# Patient Record
Sex: FEMALE | HISPANIC OR LATINO | URBAN - METROPOLITAN AREA
[De-identification: names, ages, dates, MRNs, and addresses within clinical notes are randomized per-mention and may not be internally consistent; named-entity substitution may affect disease eponyms.]

---

## 2019-01-15 ENCOUNTER — OFFICE VISIT (OUTPATIENT)
Dept: URBAN - METROPOLITAN AREA CLINIC 48 | Facility: CLINIC | Age: 64
End: 2019-01-15
Payer: COMMERCIAL

## 2019-01-15 DIAGNOSIS — H25.13 AGE-RELATED NUCLEAR CATARACT, BILATERAL: ICD-10-CM

## 2019-01-15 PROCEDURE — 92014 COMPRE OPH EXAM EST PT 1/>: CPT | Performed by: OPHTHALMOLOGY

## 2019-01-15 ASSESSMENT — KERATOMETRY
OD: 44.88
OS: 44.75

## 2019-01-15 ASSESSMENT — INTRAOCULAR PRESSURE
OD: 15
OS: 16

## 2019-01-15 NOTE — IMPRESSION/PLAN
Impression: Type 2 diab w mild nonprlf diabetic rtnop w/o macular edema, bilateral: D81.4339.  Plan:

## 2020-09-01 ENCOUNTER — OFFICE VISIT (OUTPATIENT)
Dept: URBAN - METROPOLITAN AREA CLINIC 48 | Facility: CLINIC | Age: 65
End: 2020-09-01
Payer: COMMERCIAL

## 2020-09-01 DIAGNOSIS — E11.9 TYPE 2 DIABETES MELLITUS WITHOUT COMPLICATIONS: ICD-10-CM

## 2020-09-01 DIAGNOSIS — H35.3131 NEXDTVE AGE-RELATED MCLR DEGN, BILATERAL, EARLY DRY STAGE: Primary | ICD-10-CM

## 2020-09-01 PROCEDURE — 92134 CPTRZ OPH DX IMG PST SGM RTA: CPT | Performed by: OPHTHALMOLOGY

## 2020-09-01 PROCEDURE — 92014 COMPRE OPH EXAM EST PT 1/>: CPT | Performed by: OPHTHALMOLOGY

## 2020-09-01 ASSESSMENT — VISUAL ACUITY
OS: 20/20
OD: 20/20

## 2020-09-01 ASSESSMENT — KERATOMETRY
OS: 44.75
OD: 44.88

## 2020-09-01 ASSESSMENT — INTRAOCULAR PRESSURE
OD: 16
OS: 13

## 2020-09-01 NOTE — IMPRESSION/PLAN
Impression: Type 2 diabetes mellitus without complications: Q89.8.  Plan: No diabetic retinopathy on today's exam

Recommend yearly diabetic exam with Dr. Laith Monk or Dr. Nadine Vallecillo

## 2020-09-01 NOTE — IMPRESSION/PLAN
Impression: Age-related nuclear cataract, bilateral: H25.13. Plan: Not visually significant to patient Will monitor Cataract eval next exam if patient desires RTC 1 year Cat  eval ( long exam)

## 2021-01-18 ENCOUNTER — TESTING ONLY (OUTPATIENT)
Dept: URBAN - METROPOLITAN AREA CLINIC 48 | Facility: CLINIC | Age: 66
End: 2021-01-18
Payer: COMMERCIAL

## 2021-01-18 PROCEDURE — 92136 OPHTHALMIC BIOMETRY: CPT | Performed by: OPHTHALMOLOGY

## 2021-01-18 ASSESSMENT — PACHYMETRY
OS: 23.13
OS: 3.24
OD: 3.13
OD: 23.21

## 2021-01-18 ASSESSMENT — KERATOMETRY
OD: 45.10
OS: 44.95

## 2021-02-02 ENCOUNTER — SURGERY (OUTPATIENT)
Dept: URBAN - METROPOLITAN AREA SURGERY 26 | Facility: SURGERY | Age: 66
End: 2021-02-02
Payer: COMMERCIAL

## 2021-02-02 PROCEDURE — 66984 XCAPSL CTRC RMVL W/O ECP: CPT | Performed by: OPHTHALMOLOGY

## 2021-02-03 ENCOUNTER — POST-OPERATIVE VISIT (OUTPATIENT)
Dept: URBAN - METROPOLITAN AREA CLINIC 48 | Facility: CLINIC | Age: 66
End: 2021-02-03
Payer: COMMERCIAL

## 2021-02-03 PROCEDURE — 99024 POSTOP FOLLOW-UP VISIT: CPT | Performed by: OPHTHALMOLOGY

## 2021-02-03 ASSESSMENT — INTRAOCULAR PRESSURE: OD: 14

## 2021-02-08 ENCOUNTER — POST-OPERATIVE VISIT (OUTPATIENT)
Dept: URBAN - METROPOLITAN AREA CLINIC 48 | Facility: CLINIC | Age: 66
End: 2021-02-08
Payer: COMMERCIAL

## 2021-02-08 DIAGNOSIS — Z48.810 ENCOUNTER FOR SURGICAL AFTERCARE FOLLOWING SURGERY ON A SENSE ORGAN: Primary | ICD-10-CM

## 2021-02-08 PROCEDURE — 99024 POSTOP FOLLOW-UP VISIT: CPT | Performed by: STUDENT IN AN ORGANIZED HEALTH CARE EDUCATION/TRAINING PROGRAM

## 2021-02-08 ASSESSMENT — INTRAOCULAR PRESSURE
OD: 15
OS: 13

## 2021-02-08 NOTE — IMPRESSION/PLAN
Impression: S/P Cataract Extraction by phacoemulsification with IOL placement OD - 6 Days. Encounter for surgical aftercare following surgery on a sense organ  Z48.810. Plan: - Right eye is doing well
- d/c ofloxacin - Begin taper of prednisolone acetate and ketorolac to TID for a week, then BID for week, then daily for a week, then stop
- RT/RD precautions reviewed with the patient
- Can resume normal activities with the exception of avoiding swimming for 1 more week Continue with 2nd surgery for CE IOL OS as scheduled

## 2021-02-16 ENCOUNTER — SURGERY (OUTPATIENT)
Dept: URBAN - METROPOLITAN AREA SURGERY 26 | Facility: SURGERY | Age: 66
End: 2021-02-16
Payer: COMMERCIAL

## 2021-02-16 DIAGNOSIS — H25.12 AGE-RELATED NUCLEAR CATARACT, LEFT EYE: Primary | ICD-10-CM

## 2021-02-16 PROCEDURE — 66984 XCAPSL CTRC RMVL W/O ECP: CPT | Performed by: OPHTHALMOLOGY

## 2021-02-17 ENCOUNTER — POST-OPERATIVE VISIT (OUTPATIENT)
Dept: URBAN - METROPOLITAN AREA CLINIC 48 | Facility: CLINIC | Age: 66
End: 2021-02-17
Payer: COMMERCIAL

## 2021-02-17 PROCEDURE — 99024 POSTOP FOLLOW-UP VISIT: CPT | Performed by: OPTOMETRIST

## 2021-02-17 ASSESSMENT — INTRAOCULAR PRESSURE
OD: 22
OS: 14

## 2021-02-17 NOTE — IMPRESSION/PLAN
Impression: S/P Cataract Extraction by phacoemulsification with IOL placement OS - 1 Day. Presence of intraocular lens  Z96.1. Plan: OD cont taper of Ket, Pred OS Start NSAID, AB, Steroid QID Restrictions discusses, RD precautions RTO 1 week PO2

## 2021-02-22 ENCOUNTER — POST-OPERATIVE VISIT (OUTPATIENT)
Dept: URBAN - METROPOLITAN AREA CLINIC 48 | Facility: CLINIC | Age: 66
End: 2021-02-22
Payer: COMMERCIAL

## 2021-02-22 ASSESSMENT — INTRAOCULAR PRESSURE
OD: 17
OS: 16

## 2021-02-22 NOTE — IMPRESSION/PLAN
Impression: S/P Cataract Extraction by phacoemulsification with IOL placement OS - 6 Days. Presence of intraocular lens  Z96.1. Excellent post op course Plan: Stop Antibiotic, NSAID Cont steroid TID x 1 week, BID x 1 week, then QD x 1 week, then D/C Disc restrictions off, RD precautions RTO 3 weeks for PO3

## 2021-03-15 ENCOUNTER — POST-OPERATIVE VISIT (OUTPATIENT)
Dept: URBAN - METROPOLITAN AREA CLINIC 48 | Facility: CLINIC | Age: 66
End: 2021-03-15
Payer: COMMERCIAL

## 2021-03-15 PROCEDURE — 99024 POSTOP FOLLOW-UP VISIT: CPT | Performed by: OPTOMETRIST

## 2021-03-15 ASSESSMENT — INTRAOCULAR PRESSURE
OD: 17
OS: 15

## 2021-03-15 NOTE — IMPRESSION/PLAN
Impression: S/P Cataract Extraction by phacoemulsification with IOL placement OS - 27 Days. Presence of intraocular lens  Z96.1. Excellent post op course Plan: PC IOL doing well and in place. She has completed all post operative drops. Okay to obtain updated refraction, if needed. 

RTC: 3 months for DFE/OCT mac with Dr. Nikolai Freedman for DM/early Dry AMD Check

## 2021-03-19 ENCOUNTER — POST-OPERATIVE VISIT (OUTPATIENT)
Dept: URBAN - METROPOLITAN AREA CLINIC 48 | Facility: CLINIC | Age: 66
End: 2021-03-19
Payer: COMMERCIAL

## 2021-03-19 DIAGNOSIS — Z96.1 PRESENCE OF INTRAOCULAR LENS: Primary | ICD-10-CM

## 2021-03-19 PROCEDURE — 99024 POSTOP FOLLOW-UP VISIT: CPT | Performed by: OPTOMETRIST

## 2021-03-19 ASSESSMENT — INTRAOCULAR PRESSURE
OD: 14
OS: 10

## 2021-03-19 NOTE — IMPRESSION/PLAN
Impression: S/P Cataract Extraction by phacoemulsification with IOL placement OS - 31 Days. Presence of intraocular lens  Z96.1. 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  Recommend patient restart Prednisolone QID x4days, TID x4days, BID x4 days, QD x4days, then D/C

RTC in 10 days for PO

## 2021-03-29 ENCOUNTER — POST-OPERATIVE VISIT (OUTPATIENT)
Dept: URBAN - METROPOLITAN AREA CLINIC 48 | Facility: CLINIC | Age: 66
End: 2021-03-29
Payer: COMMERCIAL

## 2021-03-29 PROCEDURE — 99024 POSTOP FOLLOW-UP VISIT: CPT | Performed by: OPTOMETRIST

## 2021-03-29 ASSESSMENT — INTRAOCULAR PRESSURE
OD: 14
OS: 13

## 2021-03-29 NOTE — IMPRESSION/PLAN
Impression: S/P Cataract Extraction by phacoemulsification with IOL placement OS - 41 Days. Presence of intraocular lens  Z96.1. Post operative instructions reviewed - Plan: Rare Cell and Flare still present on exam. Discussed with patient. Informed her to increase Prednisolone to TID and will follow up with Dr Radha Trammell. RTC: 1 week for DE/OCT mac for ARMD. Please eval if inflammation is still present and continue slow taper in left eye. Patient also wants to discuss lids.

## 2021-04-13 ENCOUNTER — POST-OPERATIVE VISIT (OUTPATIENT)
Dept: URBAN - METROPOLITAN AREA CLINIC 48 | Facility: CLINIC | Age: 66
End: 2021-04-13
Payer: COMMERCIAL

## 2021-04-13 PROCEDURE — 99024 POSTOP FOLLOW-UP VISIT: CPT | Performed by: STUDENT IN AN ORGANIZED HEALTH CARE EDUCATION/TRAINING PROGRAM

## 2021-04-13 ASSESSMENT — INTRAOCULAR PRESSURE
OD: 15
OS: 17

## 2021-04-13 NOTE — IMPRESSION/PLAN
Impression: S/P Cataract Extraction by phacoemulsification with IOL placement OS - 56 Days. Presence of intraocular lens  Z96.1. Plan: OS Increase Pf 6 x  daily x 1 wk. Keep appt. as scheduled with Dr. Floyd Samuels.

## 2021-04-14 ENCOUNTER — POST-OPERATIVE VISIT (OUTPATIENT)
Dept: URBAN - METROPOLITAN AREA CLINIC 48 | Facility: CLINIC | Age: 66
End: 2021-04-14
Payer: COMMERCIAL

## 2021-04-14 PROCEDURE — 99024 POSTOP FOLLOW-UP VISIT: CPT | Performed by: OPHTHALMOLOGY

## 2021-04-14 RX ORDER — ATROPINE SULFATE 10 MG/ML
1 % SOLUTION/ DROPS OPHTHALMIC
Qty: 5 | Refills: 0 | Status: INACTIVE
Start: 2021-04-14 | End: 2021-06-15

## 2021-04-14 ASSESSMENT — INTRAOCULAR PRESSURE
OD: 14
OS: 15

## 2021-04-14 NOTE — IMPRESSION/PLAN
Impression: S/P Cataract Extraction by phacoemulsification with IOL placement OS - 57 Days. Presence of intraocular lens  Z96.1. Plan: Cell and Flare still present in OS Start: Atropine 1% QD OS Continue Pred Q2hr OS [facility]

RTC Tuesday w/ Dr. Corrinne Oliva.

## 2021-04-20 ENCOUNTER — POST-OPERATIVE VISIT (OUTPATIENT)
Dept: URBAN - METROPOLITAN AREA CLINIC 48 | Facility: CLINIC | Age: 66
End: 2021-04-20
Payer: COMMERCIAL

## 2021-04-20 PROCEDURE — 99024 POSTOP FOLLOW-UP VISIT: CPT | Performed by: OPHTHALMOLOGY

## 2021-04-20 ASSESSMENT — INTRAOCULAR PRESSURE: OS: 15

## 2021-04-20 NOTE — IMPRESSION/PLAN
Impression: S/P Cataract Extraction by phacoemulsification with IOL placement OS - 63 Days. Presence of intraocular lens  Z96.1.  Post operative instructions reviewed - Condition is improving -

PF q 3 hrs OS x 1 week then q 4 hrs x week OS Plan: RTC 2 weeks PO

## 2021-05-05 ENCOUNTER — POST-OPERATIVE VISIT (OUTPATIENT)
Dept: URBAN - METROPOLITAN AREA CLINIC 48 | Facility: CLINIC | Age: 66
End: 2021-05-05
Payer: COMMERCIAL

## 2021-05-05 PROCEDURE — 99024 POSTOP FOLLOW-UP VISIT: CPT | Performed by: OPHTHALMOLOGY

## 2021-05-05 RX ORDER — PREDNISOLONE ACETATE 10 MG/ML
1 % SUSPENSION/ DROPS OPHTHALMIC
Qty: 5 | Refills: 0 | Status: INACTIVE
Start: 2021-05-05 | End: 2021-06-15

## 2021-05-05 ASSESSMENT — INTRAOCULAR PRESSURE
OS: 15
OD: 16

## 2021-05-05 NOTE — IMPRESSION/PLAN
Impression: S/P Cataract Extraction by phacoemulsification with IOL placement OS - 78 Days. Presence of intraocular lens  Z96.1. Excellent post op course   Post operative instructions reviewed - Condition is improving -  Patient has trace cell, patient has stopped her PF. Patient to use Aft 1-4 times a day.  Plan: RTC 1 week PO

## 2021-05-25 ENCOUNTER — OFFICE VISIT (OUTPATIENT)
Dept: URBAN - METROPOLITAN AREA CLINIC 48 | Facility: CLINIC | Age: 66
End: 2021-05-25
Payer: COMMERCIAL

## 2021-05-25 PROCEDURE — 92012 INTRM OPH EXAM EST PATIENT: CPT | Performed by: OPHTHALMOLOGY

## 2021-05-25 ASSESSMENT — INTRAOCULAR PRESSURE
OD: 15
OS: 15

## 2021-05-25 NOTE — IMPRESSION/PLAN
Impression: Dry eye syndrome of bilateral lacrimal glands: H04.123. Plan: D/C Pf

OS Start OTC PFAFT Systane or Refresh 6 x daily RTC Friday w/Dr. Slime Kaur

## 2021-05-28 ENCOUNTER — OFFICE VISIT (OUTPATIENT)
Dept: URBAN - METROPOLITAN AREA CLINIC 48 | Facility: CLINIC | Age: 66
End: 2021-05-28
Payer: COMMERCIAL

## 2021-05-28 DIAGNOSIS — H04.123 DRY EYE SYNDROME OF BILATERAL LACRIMAL GLANDS: Primary | ICD-10-CM

## 2021-05-28 PROCEDURE — 99213 OFFICE O/P EST LOW 20 MIN: CPT | Performed by: OPTOMETRIST

## 2021-05-28 ASSESSMENT — INTRAOCULAR PRESSURE
OD: 14
OS: 12

## 2021-05-28 NOTE — IMPRESSION/PLAN
Impression: Dry eye syndrome of bilateral lacrimal glands: H04.123. The eye looks less dry today and no AC reaction noted. Pulse of steroids didn't help the pain and she says the frequent PF AT has made it worse. No pain with palpation of sinuses though will cont to consider, patient says she has a history of sinus problems. IOP normal, inflammation less today both in [facility] [place] and anterior surface though pain persists. Plan: Cont PF AT 3-4 times daily, sample given of BromSite to use BIS OS Patient also to start O3FA

RTO 2 weeks for follow up or PRN

## 2021-06-15 ENCOUNTER — OFFICE VISIT (OUTPATIENT)
Dept: URBAN - METROPOLITAN AREA CLINIC 48 | Facility: CLINIC | Age: 66
End: 2021-06-15
Payer: COMMERCIAL

## 2021-06-15 DIAGNOSIS — H43.812 VITREOUS DEGENERATION, LEFT EYE: Primary | ICD-10-CM

## 2021-06-15 PROCEDURE — 92014 COMPRE OPH EXAM EST PT 1/>: CPT | Performed by: OPHTHALMOLOGY

## 2021-06-15 ASSESSMENT — INTRAOCULAR PRESSURE
OS: 13
OD: 14

## 2021-06-15 NOTE — IMPRESSION/PLAN
Impression: Dry eye syndrome of bilateral lacrimal glands: H04.123. The eye looks less dry today and no AC reaction noted. Pulse of steroids didn't help the pain and she says the frequent PF AT has made it worse. No pain with palpation of sinuses though will cont to consider, patient says she has a history of sinus problems. IOP normal, inflammation less today both in [facility] and anterior surface though pain persists. Plan: Cont PF AT 3-4 times daily OU Continue to monitor

## 2021-06-15 NOTE — IMPRESSION/PLAN
Impression: Vitreous degeneration, left eye: H43.812. Plan: Posterior vitreous detachment accounts for the patient's complaints. There is no evidence of retinal pathology. All signs and risks of retinal detachment and tears were discussed in detail. Patient instructed to call the office immediately if any symptoms noted.   

RTC 6 week DE/OCT Bony Abarca

## 2021-08-03 ENCOUNTER — OFFICE VISIT (OUTPATIENT)
Dept: URBAN - METROPOLITAN AREA CLINIC 48 | Facility: CLINIC | Age: 66
End: 2021-08-03
Payer: COMMERCIAL

## 2021-08-03 DIAGNOSIS — H43.811 VITREOUS DEGENERATION, RIGHT EYE: Primary | ICD-10-CM

## 2021-08-03 PROCEDURE — 92014 COMPRE OPH EXAM EST PT 1/>: CPT | Performed by: OPHTHALMOLOGY

## 2021-08-03 ASSESSMENT — INTRAOCULAR PRESSURE
OD: 12
OS: 11

## 2021-08-03 NOTE — IMPRESSION/PLAN
Impression: Vitreous degeneration, right eye: H43.811. Plan: Posterior vitreous detachment accounts for the patient's complaints. There is no evidence of retinal pathology. All signs and risks of retinal detachment and tears were discussed in detail. Patient instructed to call the office immediately if any symptoms noted.   

RTC  2-3 months DE

## 2021-11-11 ENCOUNTER — OFFICE VISIT (OUTPATIENT)
Dept: URBAN - METROPOLITAN AREA CLINIC 48 | Facility: CLINIC | Age: 66
End: 2021-11-11
Payer: COMMERCIAL

## 2021-11-11 DIAGNOSIS — H26.492 OTHER SECONDARY CATARACT, LEFT EYE: Primary | ICD-10-CM

## 2021-11-11 PROCEDURE — 92014 COMPRE OPH EXAM EST PT 1/>: CPT | Performed by: OPHTHALMOLOGY

## 2021-11-11 ASSESSMENT — INTRAOCULAR PRESSURE
OS: 13
OD: 15

## 2021-11-11 NOTE — IMPRESSION/PLAN
Impression: Other secondary cataract, left eye: H26.492. Plan: Risks, benefits, alternatives, and expectations of YAG capsulotomy were discussed. The patient desires a YAG capsulotomy in the left eye.  
Schedule YAG capsulotomy in the left eye with 1 day PO1 then 1 week PO. RL 2

## 2021-11-11 NOTE — IMPRESSION/PLAN
Impression: Vitreous degeneration, right eye: H43.811. Plan: Posterior vitreous detachment accounts for the patient's complaints. There is no evidence of retinal pathology. All signs and risks of retinal detachment and tears were discussed in detail. Patient instructed to call the office immediately if any symptoms noted.   

RTC 6 months DE

## 2021-12-06 ENCOUNTER — SURGERY (OUTPATIENT)
Dept: URBAN - METROPOLITAN AREA SURGERY 26 | Facility: SURGERY | Age: 66
End: 2021-12-06
Payer: COMMERCIAL

## 2021-12-06 PROCEDURE — 66821 AFTER CATARACT LASER SURGERY: CPT | Performed by: OPHTHALMOLOGY

## 2021-12-13 ENCOUNTER — POST-OPERATIVE VISIT (OUTPATIENT)
Dept: URBAN - METROPOLITAN AREA CLINIC 48 | Facility: CLINIC | Age: 66
End: 2021-12-13
Payer: COMMERCIAL

## 2021-12-13 PROCEDURE — 92134 CPTRZ OPH DX IMG PST SGM RTA: CPT | Performed by: STUDENT IN AN ORGANIZED HEALTH CARE EDUCATION/TRAINING PROGRAM

## 2021-12-13 PROCEDURE — 99024 POSTOP FOLLOW-UP VISIT: CPT | Performed by: STUDENT IN AN ORGANIZED HEALTH CARE EDUCATION/TRAINING PROGRAM

## 2021-12-13 NOTE — IMPRESSION/PLAN
Impression:  Encounter for surgical aftercare following surgery on a sense organ  Z48.810.  Excellent post op course Plan: f/u 1 month Dr. Sejal Rosenbaum for new floaters in [facility], 

DE/OCT

## 2022-03-01 ENCOUNTER — POST-OPERATIVE VISIT (OUTPATIENT)
Dept: URBAN - METROPOLITAN AREA CLINIC 48 | Facility: CLINIC | Age: 67
End: 2022-03-01
Payer: COMMERCIAL

## 2022-03-01 PROCEDURE — 99024 POSTOP FOLLOW-UP VISIT: CPT | Performed by: OPHTHALMOLOGY

## 2022-03-01 ASSESSMENT — INTRAOCULAR PRESSURE
OS: 15
OD: 15

## 2022-09-01 ENCOUNTER — OFFICE VISIT (OUTPATIENT)
Dept: URBAN - METROPOLITAN AREA CLINIC 48 | Facility: CLINIC | Age: 67
End: 2022-09-01
Payer: COMMERCIAL

## 2022-09-01 DIAGNOSIS — H35.3111 NONEXUDATIVE MACULAR DEGENERATION, EARLY DRY STAGE, RIGHT EYE: Primary | ICD-10-CM

## 2022-09-01 DIAGNOSIS — H04.123 DRY EYE SYNDROME OF BILATERAL LACRIMAL GLANDS: ICD-10-CM

## 2022-09-01 PROCEDURE — 92014 COMPRE OPH EXAM EST PT 1/>: CPT | Performed by: OPHTHALMOLOGY

## 2022-09-01 PROCEDURE — 92134 CPTRZ OPH DX IMG PST SGM RTA: CPT | Performed by: OPHTHALMOLOGY

## 2022-09-01 ASSESSMENT — INTRAOCULAR PRESSURE
OS: 13
OD: 13

## 2022-09-01 NOTE — IMPRESSION/PLAN
Impression: Dry eye syndrome of bilateral lacrimal glands: H04.123. Plan: ALEX w/ allergy component accounts for patients complaints. Recommend patient to use AFT 1-4 times a day OU ( brand name preferred). Hand out of different Dry eye treatment recommendations given to patient.

## 2022-09-01 NOTE — IMPRESSION/PLAN
Impression: Nonexudative macular degeneration, early dry stage, right eye: H35.3111. Plan: OCT ordered and performed today. Discussed diagnosis in detail with patient. Discussed treatment options with patient. Discussed risks and benefits and patient understands. Macular degeneration, dry type with stable vision. Will continue to monitor vision and the patient has been instructed to call with any vision changes. OZZY and AREDS2 discussed with patient.  

rtc 12 month DE OCT/MAC

## 2023-01-23 NOTE — ASSESSMENT/PLAN
Impression:  Plan: Very early Dry AMD

 Macular degeneration, dry type with stable vision. Will continue to monitor vision and the patient has been instructed to call with any vision changes. OZZY and AREDS2 discussed with patient.  

RTC 1 year DE/OCT mac ( long exam) abdominal pain

## 2023-11-21 ENCOUNTER — OFFICE VISIT (OUTPATIENT)
Dept: URBAN - METROPOLITAN AREA CLINIC 48 | Facility: CLINIC | Age: 68
End: 2023-11-21
Payer: COMMERCIAL

## 2023-11-21 DIAGNOSIS — H57.12 OCULAR PAIN, LEFT EYE: ICD-10-CM

## 2023-11-21 DIAGNOSIS — H35.3111 NONEXUDATIVE MACULAR DEGENERATION, EARLY DRY STAGE, RIGHT EYE: ICD-10-CM

## 2023-11-21 DIAGNOSIS — E11.9 TYPE 2 DIABETES MELLITUS W/O COMPLICATION: Primary | ICD-10-CM

## 2023-11-21 DIAGNOSIS — H35.371 PUCKERING OF MACULA, RIGHT EYE: ICD-10-CM

## 2023-11-21 PROCEDURE — 99214 OFFICE O/P EST MOD 30 MIN: CPT | Performed by: OPHTHALMOLOGY

## 2023-11-21 ASSESSMENT — INTRAOCULAR PRESSURE
OD: 14
OS: 15

## 2024-01-01 NOTE — IMPRESSION/PLAN
Impression: S/P YAG Capsulotomy (Yttrium Aluminum Kake) OS - 85 Days. Encounter for surgical aftercare following surgery on a sense organ  Z48.810. Post operative instructions reviewed - Condition is improving -

Discussed in full detail Floater from Yag laser.  Plan: RTC 3 months DFE/OCT MAC [place]

## 2024-04-11 ENCOUNTER — OFFICE VISIT (OUTPATIENT)
Dept: URBAN - METROPOLITAN AREA CLINIC 48 | Facility: CLINIC | Age: 69
End: 2024-04-11
Payer: MEDICARE

## 2024-04-11 DIAGNOSIS — H04.123 DRY EYE SYNDROME OF BILATERAL LACRIMAL GLANDS: Primary | ICD-10-CM

## 2024-04-11 PROCEDURE — 99212 OFFICE O/P EST SF 10 MIN: CPT | Performed by: STUDENT IN AN ORGANIZED HEALTH CARE EDUCATION/TRAINING PROGRAM

## 2024-04-11 ASSESSMENT — INTRAOCULAR PRESSURE
OS: 13
OD: 14

## 2024-05-29 ENCOUNTER — OFFICE VISIT (OUTPATIENT)
Dept: URBAN - METROPOLITAN AREA CLINIC 48 | Facility: CLINIC | Age: 69
End: 2024-05-29
Payer: MEDICARE

## 2024-05-29 DIAGNOSIS — H04.123 DRY EYE SYNDROME OF BILATERAL LACRIMAL GLANDS: Primary | ICD-10-CM

## 2024-05-29 PROCEDURE — 99213 OFFICE O/P EST LOW 20 MIN: CPT | Performed by: OPHTHALMOLOGY

## 2024-05-29 ASSESSMENT — INTRAOCULAR PRESSURE
OS: 14
OD: 15